# Patient Record
Sex: FEMALE | Race: WHITE
[De-identification: names, ages, dates, MRNs, and addresses within clinical notes are randomized per-mention and may not be internally consistent; named-entity substitution may affect disease eponyms.]

---

## 2023-07-31 ENCOUNTER — HOSPITAL ENCOUNTER (OUTPATIENT)
Dept: HOSPITAL 47 - PNWHC3 | Age: 48
End: 2023-07-31
Attending: ANESTHESIOLOGY
Payer: COMMERCIAL

## 2023-07-31 VITALS
DIASTOLIC BLOOD PRESSURE: 68 MMHG | RESPIRATION RATE: 15 BRPM | HEART RATE: 68 BPM | TEMPERATURE: 98.2 F | SYSTOLIC BLOOD PRESSURE: 101 MMHG

## 2023-07-31 DIAGNOSIS — M19.90: ICD-10-CM

## 2023-07-31 DIAGNOSIS — M48.061: ICD-10-CM

## 2023-07-31 DIAGNOSIS — M47.896: ICD-10-CM

## 2023-07-31 DIAGNOSIS — Z79.01: ICD-10-CM

## 2023-07-31 DIAGNOSIS — M51.36: Primary | ICD-10-CM

## 2023-07-31 PROCEDURE — 99211 OFF/OP EST MAY X REQ PHY/QHP: CPT

## 2023-07-31 NOTE — P.PAINPG
PQRS Measure Charge Sheet


Comment: 





HISTORY OF PRESENT ILLNESS:


48 yr old female as a referral from Dr Champion presents today w severe and 

chronic LBP secondary to DDD, spondylosis, facet arthropathy without myelopathy 

for evaluation. Pt states pain level is provoked at 8 /10 in intensity, 

constant, localized in the  R lower lumbar spine, sore in character without 

shooting pain.  Pain is provoked by over activity (pt is a cheerleading 

instructor).  Pain is alleviated by  PT x 6 wks in Jun 2023, chiropractic 

treatments as needed, repositioning and rest. Oswestry axial pain score at  24. 





PMH: OA


PSH: C section x2, Tonsillectomy, Adenoidectomy


SH: Negative x 3. Works as a [employer] instructor.


FH: CAD


All: See list


Meds: See list





REVIEW OF ORGAN SYSTEMS:


                     CONSTITUTIONAL:  No fevers or chills. No recent weight 

loss.


                     NEUROLOGICAL: +  numbness and tingling along the distal 

extremities. No seizure disorders or headaches.


                     MUSCULOSKELETAL: + pain 


                     PSYCHIATRIC:  Denies current depression or suicidal 

thoughts.


    


Physical Examinations  :


                Constitutional : Cooperative , not in acute distress .          

                                                                                

                                                                                

                                                                                

                                                                                

     


                Neurologic :   Cranial nerve II   to  XII  intact. No focal 

neurological deficits.


                Psychiatric : alert & oriented  x 3. Matching mood & appropriate

affect. Judgment & insight intact. 


                Musculoskeletal :     


                               Cervical Spine 


                                         Motor strength in the deltoid and 

biceps: Normal  right side. Normal  Left side


                                         Motor strength biceps and the wrist 

extensors:   Normal right side . Normal left side 


                                         Motor strength in the triceps muscle: 

Normal right side.  Normal  left side  


                                         Deep tendon reflexes:  Normal at the 

biceps. Normal at Brachioradialis. Normal at triceps


                                         Vertebral body tenderness to deep 

palpation over 


                                         Cervical facet loading test: positive 

bilaterally


                                         Spurling test: positive bilaterally


                                         Neck distraction test: positive 

bilaterally


                                         Yasmin sign: positive bilaterally


                                  Lumbar spine


                                         Motor strength lower extremities ,thigh

and legs  5/5 Right side ,  5/5  Left side 


                                         Deep tendon reflexes :   Normal  Knee 

Jerk. Normal   Ankle Jerk  


                                         Vertebral body tenderness over L5


                                         Gracia Test positive


                                         Lumbar facet Loading Test: positive 

Right  / positive Left  


                                         Range of motion of the lumbar spine  

Flexion  30 degrees,   extension   10 degrees


                                         Straight Leg Raise test: Left/ Right 

positive at  35 degrees   


                                         Argelia test: positive right /  positive 

left.


                                         Severe tenderness over the Sacroiliac 

joint  on the Right / Left  sides   


                                         Gaenslen  test: positive bilaterally


                                         Seated flexion test: positive bilatera

lly.


                                 Sacral spine :


                                         Severe tenderness over the Sacroiliac 

joint:  right side / left side 


                                         Range of motion: Flexion of the lumbar 

spine <60 degrees


                                         Range of motion: Extension of the 

lumbar spine <20 degrees


                                         Gaenslen's Test positive 


                                         Lane's Test positive 


                                         Argelia test: positive  right side /  

left side


                                         Thigh Thrust Test


                                         Sacral Thrust Test


Imaging:


MRI noncontrast of the lumbosacral spine from 6/28/23 reviewed





Assessment/ Plan : 


Lumbar DDD


Recommendation of XIMENA L5-S1 #1. May need a series of injections for optimal pain

relief. Risks, benefits of procedure discussed and patient verbalized 

understanding. Admits to aspirin or anti- coagulant use or medical history of 

diabetes. Protocol for discontinuation/ continuation of medications zeke 

procedure discussed. Minimal anesthesia provided, if clinically indicated, 

consisting of Versed and Fentanyl. All questions answered.


I have spent greater than 30 minutes on patient care today. Dr Sandoval was 

available by phone for the evaluation of this patient. The time was used to 

review the medical records including relevant urine studies and Prescription 

history (MAPs), review of the available imaging, evaluation and examination of 

the patient, coordination of care with the medical staff and if applicable 

referring physicians, as well as creation of the medical record








Controlled Substance Measures





- Controlled Substance Measures


Is patient prescribed a controlled substance at discharge?: No